# Patient Record
Sex: FEMALE | Race: WHITE | HISPANIC OR LATINO | URBAN - METROPOLITAN AREA
[De-identification: names, ages, dates, MRNs, and addresses within clinical notes are randomized per-mention and may not be internally consistent; named-entity substitution may affect disease eponyms.]

---

## 2024-07-11 ENCOUNTER — EMERGENCY (EMERGENCY)
Facility: HOSPITAL | Age: 69
LOS: 1 days | Discharge: HOME | End: 2024-07-11
Attending: EMERGENCY MEDICINE
Payer: MEDICARE

## 2024-07-11 VITALS
RESPIRATION RATE: 18 BRPM | HEART RATE: 83 BPM | TEMPERATURE: 98 F | OXYGEN SATURATION: 99 % | WEIGHT: 147.71 LBS | DIASTOLIC BLOOD PRESSURE: 85 MMHG | SYSTOLIC BLOOD PRESSURE: 181 MMHG

## 2024-07-11 VITALS
HEART RATE: 55 BPM | SYSTOLIC BLOOD PRESSURE: 107 MMHG | OXYGEN SATURATION: 99 % | DIASTOLIC BLOOD PRESSURE: 65 MMHG | RESPIRATION RATE: 58 BRPM

## 2024-07-11 PROCEDURE — 93005 ELECTROCARDIOGRAM TRACING: CPT

## 2024-07-11 PROCEDURE — 99284 EMERGENCY DEPT VISIT MOD MDM: CPT

## 2024-07-11 PROCEDURE — T1013: CPT

## 2024-07-11 PROCEDURE — 93010 ELECTROCARDIOGRAM REPORT: CPT

## 2024-07-11 PROCEDURE — 99283 EMERGENCY DEPT VISIT LOW MDM: CPT | Mod: 25

## 2024-10-23 ENCOUNTER — INPATIENT (INPATIENT)
Facility: HOSPITAL | Age: 69
LOS: 1 days | Discharge: HOME | DRG: 312 | End: 2024-10-25
Attending: HOSPITALIST | Admitting: STUDENT IN AN ORGANIZED HEALTH CARE EDUCATION/TRAINING PROGRAM
Payer: MEDICARE

## 2024-10-23 VITALS
TEMPERATURE: 97 F | RESPIRATION RATE: 16 BRPM | HEART RATE: 76 BPM | SYSTOLIC BLOOD PRESSURE: 130 MMHG | DIASTOLIC BLOOD PRESSURE: 76 MMHG | OXYGEN SATURATION: 99 %

## 2024-10-23 DIAGNOSIS — R56.9 UNSPECIFIED CONVULSIONS: ICD-10-CM

## 2024-10-23 LAB
ALBUMIN SERPL ELPH-MCNC: 4.1 G/DL — SIGNIFICANT CHANGE UP (ref 3.3–5.2)
ALP SERPL-CCNC: 93 U/L — SIGNIFICANT CHANGE UP (ref 40–120)
ALT FLD-CCNC: 25 U/L — SIGNIFICANT CHANGE UP
ANION GAP SERPL CALC-SCNC: 12 MMOL/L — SIGNIFICANT CHANGE UP (ref 5–17)
AST SERPL-CCNC: 32 U/L — HIGH
BASOPHILS # BLD AUTO: 0.02 K/UL — SIGNIFICANT CHANGE UP (ref 0–0.2)
BASOPHILS NFR BLD AUTO: 0.2 % — SIGNIFICANT CHANGE UP (ref 0–2)
BILIRUB SERPL-MCNC: 0.5 MG/DL — SIGNIFICANT CHANGE UP (ref 0.4–2)
BUN SERPL-MCNC: 20.6 MG/DL — HIGH (ref 8–20)
CALCIUM SERPL-MCNC: 10.2 MG/DL — SIGNIFICANT CHANGE UP (ref 8.4–10.5)
CHLORIDE SERPL-SCNC: 104 MMOL/L — SIGNIFICANT CHANGE UP (ref 96–108)
CO2 SERPL-SCNC: 26 MMOL/L — SIGNIFICANT CHANGE UP (ref 22–29)
CREAT SERPL-MCNC: 1.2 MG/DL — SIGNIFICANT CHANGE UP (ref 0.5–1.3)
EGFR: 48 ML/MIN/1.73M2 — LOW
EOSINOPHIL # BLD AUTO: 0.05 K/UL — SIGNIFICANT CHANGE UP (ref 0–0.5)
EOSINOPHIL NFR BLD AUTO: 0.5 % — SIGNIFICANT CHANGE UP (ref 0–6)
GLUCOSE SERPL-MCNC: 141 MG/DL — HIGH (ref 70–99)
HCT VFR BLD CALC: 38.3 % — SIGNIFICANT CHANGE UP (ref 34.5–45)
HGB BLD-MCNC: 12.9 G/DL — SIGNIFICANT CHANGE UP (ref 11.5–15.5)
IMM GRANULOCYTES NFR BLD AUTO: 0.2 % — SIGNIFICANT CHANGE UP (ref 0–0.9)
LYMPHOCYTES # BLD AUTO: 1.49 K/UL — SIGNIFICANT CHANGE UP (ref 1–3.3)
LYMPHOCYTES # BLD AUTO: 14.3 % — SIGNIFICANT CHANGE UP (ref 13–44)
MCHC RBC-ENTMCNC: 31.2 PG — SIGNIFICANT CHANGE UP (ref 27–34)
MCHC RBC-ENTMCNC: 33.7 GM/DL — SIGNIFICANT CHANGE UP (ref 32–36)
MCV RBC AUTO: 92.5 FL — SIGNIFICANT CHANGE UP (ref 80–100)
MONOCYTES # BLD AUTO: 0.61 K/UL — SIGNIFICANT CHANGE UP (ref 0–0.9)
MONOCYTES NFR BLD AUTO: 5.9 % — SIGNIFICANT CHANGE UP (ref 2–14)
NEUTROPHILS # BLD AUTO: 8.23 K/UL — HIGH (ref 1.8–7.4)
NEUTROPHILS NFR BLD AUTO: 78.9 % — HIGH (ref 43–77)
PLATELET # BLD AUTO: 264 K/UL — SIGNIFICANT CHANGE UP (ref 150–400)
POTASSIUM SERPL-MCNC: 5.2 MMOL/L — SIGNIFICANT CHANGE UP (ref 3.5–5.3)
POTASSIUM SERPL-SCNC: 5.2 MMOL/L — SIGNIFICANT CHANGE UP (ref 3.5–5.3)
PROT SERPL-MCNC: 7.2 G/DL — SIGNIFICANT CHANGE UP (ref 6.6–8.7)
RBC # BLD: 4.14 M/UL — SIGNIFICANT CHANGE UP (ref 3.8–5.2)
RBC # FLD: 13.1 % — SIGNIFICANT CHANGE UP (ref 10.3–14.5)
SODIUM SERPL-SCNC: 142 MMOL/L — SIGNIFICANT CHANGE UP (ref 135–145)
TROPONIN T, HIGH SENSITIVITY RESULT: 14 NG/L — SIGNIFICANT CHANGE UP (ref 0–51)
TROPONIN T, HIGH SENSITIVITY RESULT: 17 NG/L — SIGNIFICANT CHANGE UP (ref 0–51)
WBC # BLD: 10.42 K/UL — SIGNIFICANT CHANGE UP (ref 3.8–10.5)
WBC # FLD AUTO: 10.42 K/UL — SIGNIFICANT CHANGE UP (ref 3.8–10.5)

## 2024-10-23 PROCEDURE — 71045 X-RAY EXAM CHEST 1 VIEW: CPT | Mod: 26

## 2024-10-23 PROCEDURE — 99223 1ST HOSP IP/OBS HIGH 75: CPT

## 2024-10-23 PROCEDURE — 70450 CT HEAD/BRAIN W/O DYE: CPT | Mod: 26,MC

## 2024-10-23 PROCEDURE — 93010 ELECTROCARDIOGRAM REPORT: CPT

## 2024-10-23 PROCEDURE — 99285 EMERGENCY DEPT VISIT HI MDM: CPT

## 2024-10-23 RX ORDER — ACETAMINOPHEN 500 MG
650 TABLET ORAL EVERY 6 HOURS
Refills: 0 | Status: DISCONTINUED | OUTPATIENT
Start: 2024-10-23 | End: 2024-10-25

## 2024-10-23 RX ORDER — RALOXIFENE HYDROCHLORIDE 60 MG/1
1 TABLET, FILM COATED ORAL
Refills: 0 | DISCHARGE

## 2024-10-23 RX ORDER — CELECOXIB 100 MG
200 CAPSULE ORAL DAILY
Refills: 0 | Status: DISCONTINUED | OUTPATIENT
Start: 2024-10-23 | End: 2024-10-25

## 2024-10-23 RX ORDER — MELATONIN 5 MG
3 TABLET ORAL AT BEDTIME
Refills: 0 | Status: DISCONTINUED | OUTPATIENT
Start: 2024-10-23 | End: 2024-10-25

## 2024-10-23 RX ORDER — VALSARTAN 160 MG/1
320 TABLET ORAL DAILY
Refills: 0 | Status: DISCONTINUED | OUTPATIENT
Start: 2024-10-23 | End: 2024-10-25

## 2024-10-23 RX ORDER — ONDANSETRON HYDROCHLORIDE 2 MG/ML
4 INJECTION, SOLUTION INTRAMUSCULAR; INTRAVENOUS EVERY 8 HOURS
Refills: 0 | Status: DISCONTINUED | OUTPATIENT
Start: 2024-10-23 | End: 2024-10-25

## 2024-10-23 RX ORDER — VALSARTAN 160 MG/1
1 TABLET ORAL
Refills: 0 | DISCHARGE

## 2024-10-23 RX ORDER — RALOXIFENE HYDROCHLORIDE 60 MG/1
60 TABLET, FILM COATED ORAL DAILY
Refills: 0 | Status: DISCONTINUED | OUTPATIENT
Start: 2024-10-23 | End: 2024-10-25

## 2024-10-23 RX ORDER — SODIUM CHLORIDE 9 MG/ML
1000 INJECTION, SOLUTION INTRAMUSCULAR; INTRAVENOUS; SUBCUTANEOUS ONCE
Refills: 0 | Status: COMPLETED | OUTPATIENT
Start: 2024-10-23 | End: 2024-10-23

## 2024-10-23 RX ORDER — MAGNESIUM, ALUMINUM HYDROXIDE 200-200 MG
30 TABLET,CHEWABLE ORAL EVERY 4 HOURS
Refills: 0 | Status: DISCONTINUED | OUTPATIENT
Start: 2024-10-23 | End: 2024-10-25

## 2024-10-23 RX ADMIN — SODIUM CHLORIDE 1000 MILLILITER(S): 9 INJECTION, SOLUTION INTRAMUSCULAR; INTRAVENOUS; SUBCUTANEOUS at 16:58

## 2024-10-23 NOTE — H&P ADULT - ASSESSMENT
ASSESSMENT:  69F with PMHX Pre-DM2, HTN, HLD, Osteoporosis, Osteoarthritis presents to [facility] ER c/o weakness and syncope after gardening/doing yard work outside admitted for syncope and seizure workup.     PLAN:  Syncope r/o Seizure  -Admit to [place]  -VS q4  -EKG +NSR 78bpm no acute sttw changes qtc 435 read pending  -Repeat EKG in AM  -Troponin x2 negative  -Repeat Labs AM  -Check TTE  -CTH WO negative  -Check EEG  -Neuro consulted for AM  -Seizure/Fall Precautions    HTN, HLD  -Valsartan 320mg q24  -Atorvastatin 40mg qHS    Pre-DM2  -Lifestyle modification. Outpt F/u for continued mgmt.    Osteoporosis/Osteoarthritis  -Meloxicam 7.5mg q24  -Raloxifene 60mg q24    VTE PPX: SCDs. Encourage ambulation.   DISPO: Acute. Pending further workup/treatment for syncope and seizure. Pending neuro eval in AM. DC home in 24-48 hours.

## 2024-10-23 NOTE — H&P ADULT - NSHPPHYSICALEXAM_GEN_ALL_CORE
Vital Signs Last 24 Hrs  T(C): 36.4 (24 Oct 2024 00:56), Max: 36.6 (23 Oct 2024 20:31)  T(F): 97.6 (24 Oct 2024 00:56), Max: 97.9 (23 Oct 2024 20:31)  HR: 68 (24 Oct 2024 00:56) (68 - 79)  BP: 142/70 (24 Oct 2024 00:56) (130/76 - 142/70)  BP(mean): --  RR: 18 (24 Oct 2024 00:56) (13 - 18)  SpO2: 98% (24 Oct 2024 00:56) (98% - 100%)    Parameters below as of 24 Oct 2024 00:56  Patient On (Oxygen Delivery Method): room air    Constitutional: NAD, VSS  Head: NC/AT  Eyes: PERRL, EOMI, anicteric sclera, conjunctiva WNL  ENT: Normal Pharynx, MMM, No tonsillar exudate/erythema  Neck: Supple, Non-tender  Chest: Non-tender, no rashes  Cardio: RRR, s1/s2, no appreciable murmurs/rubs/gallops  Resp: BS CTA bilaterally, no wheezing/rhonchi/rales  Abd: Soft, Non-tender, Non-distended, no rebound/guarding/rigidity  : not examined  Rectal: not examined  MSK: moving all extremities, no motor weakness, full ROM x4  Ext: palpable distal pulses, good capillary refill, no clubbing/cyanosis/edema  Psych: appropriate, cooperative  Neuro: CN II-XII grossly intact, no focal deficits  Skin: Warm/Dry. No rashes.

## 2024-10-23 NOTE — ED ADULT TRIAGE NOTE - CHIEF COMPLAINT QUOTE
arrive to ED c/o weakness, near syncope after doing gardening, now feels better. did not eat prior to events. finger stick, ekg in progress. ed  nick

## 2024-10-23 NOTE — ED ADULT NURSE NOTE - OBJECTIVE STATEMENT
Pt A&Ox4, NAD. Pt presents to the ED s/p near syncopal episode PTA. Pt with no complaints at this time. Pt stating that she is feeling better. Breathing even and unlabored. Placed on CM, ED workup in progress.

## 2024-10-23 NOTE — ED PROVIDER NOTE - OBJECTIVE STATEMENT
Pt is a 70 yo F here for syncope/seizure.  Pt states that she was outside today when she felt very fatigued. Pt states that she went inside and her vision was black and her  sat her down. Pt then started to shake for approx. 30 seconds and had incontinence. Pt woke up and was mildly confused just stated hse didn't feel well or know what happened.  this has never happened before. no cp. nso ob. no n/v.

## 2024-10-23 NOTE — ED PROVIDER NOTE - CLINICAL SUMMARY MEDICAL DECISION MAKING FREE TEXT BOX
Pt with syncope v. seizure.  will check labs and Ct. Case d/w neuro attending, dr. fernandez, who agrees that this is likely convulsions related to syncope but given lack of clarity would offer admission for EEG. Pt and family prefer admission for EEg. Pt signed out to dr. howard pending labs, ct, and admission.

## 2024-10-23 NOTE — ED ADULT NURSE NOTE - NSFALLUNIVINTERV_ED_ALL_ED
Bed/Stretcher in lowest position, wheels locked, appropriate side rails in place/Call bell, personal items and telephone in reach/Instruct patient to call for assistance before getting out of bed/chair/stretcher/Non-slip footwear applied when patient is off stretcher/[place] to call system/Physically safe environment - no spills, clutter or unnecessary equipment/Purposeful proactive rounding/Room/bathroom lighting operational, light cord in reach

## 2024-10-23 NOTE — ED PROVIDER NOTE - NSTIMEPROVIDERCAREINITIATE_GEN_ER
Is This A New Presentation, Or A Follow-Up?: Skin Lesion
Additional History: Est pt would like Jarrett Baker M.D. to examine back of right leg, denies gown and full body skin exam. Last full body skin exam 08/22.
23-Oct-2024 15:48

## 2024-10-23 NOTE — H&P ADULT - HISTORY OF PRESENT ILLNESS
Pt seen/examined prior to midnight on 10/23/24.    69F with PMHX Pre-DM2, HTN, HLD, Osteoporosis, Osteoarthritis presents to [facility] ER c/o weakness and syncope after gardening/doing yard work outside. Episode witnessed by  at bedside who stated she felt dizzy then blacked out and had some shaking with associated urinary incontinence. Pt woke up mildly confused which self-resolved shortly thereafter without intervention. No prior similar episodes. She gardens/does yard work frequently and did not over-exert herself. No other complaints.    ROS negative unless mentioned.    PMHX: Pre-DM2, HTN, HLD, Osteoporosis, Osteoarthritis   PSHX: Denies  FamHx: Denies fam hx HTN  Social Hx: Denies etoh/tobacco/drug abuse

## 2024-10-23 NOTE — ED ADULT NURSE REASSESSMENT NOTE - NS ED NURSE REASSESS COMMENT FT1
Report received from GIO Garcia. PT is alert and oriented, with a patent and self maintained airway, with non labored breathing. PT denies CP, SOB, HA, N/V/D, Fevers or chills.
No

## 2024-10-23 NOTE — H&P ADULT - TIME BILLING
Labs/Imaging/Notes reviewed. Med rec confirmed. Orders placed. Neuro consulted for AM for possible seizure reccs appreciated.

## 2024-10-24 ENCOUNTER — RESULT REVIEW (OUTPATIENT)
Age: 69
End: 2024-10-24

## 2024-10-24 DIAGNOSIS — I47.19 OTHER SUPRAVENTRICULAR TACHYCARDIA: ICD-10-CM

## 2024-10-24 DIAGNOSIS — R55 SYNCOPE AND COLLAPSE: ICD-10-CM

## 2024-10-24 LAB
A1C WITH ESTIMATED AVERAGE GLUCOSE RESULT: 6.5 % — HIGH (ref 4–5.6)
ALBUMIN SERPL ELPH-MCNC: 3.5 G/DL — SIGNIFICANT CHANGE UP (ref 3.3–5.2)
ALP SERPL-CCNC: 80 U/L — SIGNIFICANT CHANGE UP (ref 40–120)
ALT FLD-CCNC: 20 U/L — SIGNIFICANT CHANGE UP
ANION GAP SERPL CALC-SCNC: 12 MMOL/L — SIGNIFICANT CHANGE UP (ref 5–17)
AST SERPL-CCNC: 26 U/L — SIGNIFICANT CHANGE UP
BASOPHILS # BLD AUTO: 0.03 K/UL — SIGNIFICANT CHANGE UP (ref 0–0.2)
BASOPHILS NFR BLD AUTO: 0.5 % — SIGNIFICANT CHANGE UP (ref 0–2)
BILIRUB SERPL-MCNC: 0.4 MG/DL — SIGNIFICANT CHANGE UP (ref 0.4–2)
BUN SERPL-MCNC: 17.4 MG/DL — SIGNIFICANT CHANGE UP (ref 8–20)
CALCIUM SERPL-MCNC: 8.8 MG/DL — SIGNIFICANT CHANGE UP (ref 8.4–10.5)
CHLORIDE SERPL-SCNC: 107 MMOL/L — SIGNIFICANT CHANGE UP (ref 96–108)
CHOLEST SERPL-MCNC: 143 MG/DL — SIGNIFICANT CHANGE UP
CK SERPL-CCNC: 115 U/L — SIGNIFICANT CHANGE UP (ref 25–170)
CO2 SERPL-SCNC: 22 MMOL/L — SIGNIFICANT CHANGE UP (ref 22–29)
CREAT SERPL-MCNC: 0.77 MG/DL — SIGNIFICANT CHANGE UP (ref 0.5–1.3)
EGFR: 82 ML/MIN/1.73M2 — SIGNIFICANT CHANGE UP
EOSINOPHIL # BLD AUTO: 0.12 K/UL — SIGNIFICANT CHANGE UP (ref 0–0.5)
EOSINOPHIL NFR BLD AUTO: 1.9 % — SIGNIFICANT CHANGE UP (ref 0–6)
ESTIMATED AVERAGE GLUCOSE: 140 MG/DL — HIGH (ref 68–114)
GLUCOSE SERPL-MCNC: 110 MG/DL — HIGH (ref 70–99)
HCT VFR BLD CALC: 35 % — SIGNIFICANT CHANGE UP (ref 34.5–45)
HDLC SERPL-MCNC: 54 MG/DL — SIGNIFICANT CHANGE UP
HGB BLD-MCNC: 11.6 G/DL — SIGNIFICANT CHANGE UP (ref 11.5–15.5)
IMM GRANULOCYTES NFR BLD AUTO: 0.3 % — SIGNIFICANT CHANGE UP (ref 0–0.9)
LIPID PNL WITH DIRECT LDL SERPL: 76 MG/DL — SIGNIFICANT CHANGE UP
LYMPHOCYTES # BLD AUTO: 2.49 K/UL — SIGNIFICANT CHANGE UP (ref 1–3.3)
LYMPHOCYTES # BLD AUTO: 39.3 % — SIGNIFICANT CHANGE UP (ref 13–44)
MAGNESIUM SERPL-MCNC: 1.6 MG/DL — SIGNIFICANT CHANGE UP (ref 1.6–2.6)
MCHC RBC-ENTMCNC: 30.9 PG — SIGNIFICANT CHANGE UP (ref 27–34)
MCHC RBC-ENTMCNC: 33.1 GM/DL — SIGNIFICANT CHANGE UP (ref 32–36)
MCV RBC AUTO: 93.3 FL — SIGNIFICANT CHANGE UP (ref 80–100)
MONOCYTES # BLD AUTO: 0.55 K/UL — SIGNIFICANT CHANGE UP (ref 0–0.9)
MONOCYTES NFR BLD AUTO: 8.7 % — SIGNIFICANT CHANGE UP (ref 2–14)
NEUTROPHILS # BLD AUTO: 3.12 K/UL — SIGNIFICANT CHANGE UP (ref 1.8–7.4)
NEUTROPHILS NFR BLD AUTO: 49.3 % — SIGNIFICANT CHANGE UP (ref 43–77)
NON HDL CHOLESTEROL: 89 MG/DL — SIGNIFICANT CHANGE UP
PHOSPHATE SERPL-MCNC: 2.9 MG/DL — SIGNIFICANT CHANGE UP (ref 2.4–4.7)
PLATELET # BLD AUTO: 218 K/UL — SIGNIFICANT CHANGE UP (ref 150–400)
POTASSIUM SERPL-MCNC: 4.5 MMOL/L — SIGNIFICANT CHANGE UP (ref 3.5–5.3)
POTASSIUM SERPL-SCNC: 4.5 MMOL/L — SIGNIFICANT CHANGE UP (ref 3.5–5.3)
PROT SERPL-MCNC: 5.9 G/DL — LOW (ref 6.6–8.7)
RBC # BLD: 3.75 M/UL — LOW (ref 3.8–5.2)
RBC # FLD: 13.2 % — SIGNIFICANT CHANGE UP (ref 10.3–14.5)
SODIUM SERPL-SCNC: 141 MMOL/L — SIGNIFICANT CHANGE UP (ref 135–145)
TRIGL SERPL-MCNC: 64 MG/DL — SIGNIFICANT CHANGE UP
TROPONIN T, HIGH SENSITIVITY RESULT: 11 NG/L — SIGNIFICANT CHANGE UP (ref 0–51)
TROPONIN T, HIGH SENSITIVITY RESULT: 16 NG/L — SIGNIFICANT CHANGE UP (ref 0–51)
TSH SERPL-MCNC: 2.96 UIU/ML — SIGNIFICANT CHANGE UP (ref 0.27–4.2)
VIT B12 SERPL-MCNC: 1441 PG/ML — HIGH (ref 232–1245)
WBC # BLD: 6.33 K/UL — SIGNIFICANT CHANGE UP (ref 3.8–10.5)
WBC # FLD AUTO: 6.33 K/UL — SIGNIFICANT CHANGE UP (ref 3.8–10.5)

## 2024-10-24 PROCEDURE — 95720 EEG PHY/QHP EA INCR W/VEEG: CPT

## 2024-10-24 PROCEDURE — 93010 ELECTROCARDIOGRAM REPORT: CPT

## 2024-10-24 PROCEDURE — 99222 1ST HOSP IP/OBS MODERATE 55: CPT

## 2024-10-24 PROCEDURE — 93306 TTE W/DOPPLER COMPLETE: CPT | Mod: 26

## 2024-10-24 PROCEDURE — 99233 SBSQ HOSP IP/OBS HIGH 50: CPT

## 2024-10-24 PROCEDURE — 99223 1ST HOSP IP/OBS HIGH 75: CPT

## 2024-10-24 RX ORDER — MAGNESIUM SULFATE IN 0.9% NACL 2 G/50 ML
2 INTRAVENOUS SOLUTION, PIGGYBACK (ML) INTRAVENOUS ONCE
Refills: 0 | Status: COMPLETED | OUTPATIENT
Start: 2024-10-24 | End: 2024-10-24

## 2024-10-24 RX ORDER — INFLUENZ VIR VAC TV P-SURF2003 15MCG/.5ML
0.5 SYRINGE (ML) INTRAMUSCULAR ONCE
Refills: 0 | Status: DISCONTINUED | OUTPATIENT
Start: 2024-10-24 | End: 2024-10-25

## 2024-10-24 RX ORDER — MAGNESIUM OXIDE 400 MG/1
400 TABLET ORAL DAILY
Refills: 0 | Status: DISCONTINUED | OUTPATIENT
Start: 2024-10-24 | End: 2024-10-25

## 2024-10-24 RX ORDER — METOPROLOL TARTRATE 50 MG
25 TABLET ORAL
Refills: 0 | Status: DISCONTINUED | OUTPATIENT
Start: 2024-10-24 | End: 2024-10-25

## 2024-10-24 RX ADMIN — RALOXIFENE HYDROCHLORIDE 60 MILLIGRAM(S): 60 TABLET, FILM COATED ORAL at 12:30

## 2024-10-24 RX ADMIN — Medication 200 MILLIGRAM(S): at 12:30

## 2024-10-24 RX ADMIN — Medication 25 MILLIGRAM(S): at 18:05

## 2024-10-24 RX ADMIN — Medication 25 GRAM(S): at 10:32

## 2024-10-24 RX ADMIN — Medication 40 MILLIGRAM(S): at 22:24

## 2024-10-24 RX ADMIN — VALSARTAN 320 MILLIGRAM(S): 160 TABLET ORAL at 06:36

## 2024-10-24 RX ADMIN — MAGNESIUM OXIDE 400 MILLIGRAM(S): 400 TABLET ORAL at 12:30

## 2024-10-24 RX ADMIN — Medication 30 MILLILITER(S): at 22:24

## 2024-10-24 NOTE — PROGRESS NOTE ADULT - SUBJECTIVE AND OBJECTIVE BOX
Patient is a 69y old  Female who presents with a chief complaint of Syncope/Seizure (23 Oct 2024 23:01)    Chart reviewed     incomplete .     ALLERGIES:  No Known Allergies    MEDICATIONS  (STANDING):  atorvastatin 40 milliGRAM(s) Oral at bedtime  celecoxib 200 milliGRAM(s) Oral daily  influenza  Vaccine (HIGH DOSE) 0.5 milliLiter(s) IntraMuscular once  magnesium oxide 400 milliGRAM(s) Oral daily  magnesium sulfate  IVPB 2 Gram(s) IV Intermittent once  raloxifene 60 milliGRAM(s) Oral daily  valsartan 320 milliGRAM(s) Oral daily    MEDICATIONS  (PRN):  acetaminophen     Tablet .. 650 milliGRAM(s) Oral every 6 hours PRN Temp greater or equal to 38C (100.4F), Mild Pain (1 - 3)  aluminum hydroxide/magnesium hydroxide/simethicone Suspension 30 milliLiter(s) Oral every 4 hours PRN Dyspepsia  melatonin 3 milliGRAM(s) Oral at bedtime PRN Insomnia  ondansetron Injectable 4 milliGRAM(s) IV Push every 8 hours PRN Nausea and/or Vomiting    Vital Signs Last 24 Hrs  T(F): 97.8 (24 Oct 2024 06:45), Max: 98.1 (24 Oct 2024 03:20)  HR: 74 (24 Oct 2024 06:45) (68 - 79)  BP: 143/78 (24 Oct 2024 06:45) (130/76 - 160/80)  RR: 18 (24 Oct 2024 06:45) (13 - 18)  SpO2: 97% (24 Oct 2024 06:45) (97% - 100%)  I&O's Summary      PHYSICAL EXAM:  General:   ENT:   Neck:   Lungs:   Cardio: RRR, S1/S2, No murmur  Abdomen: Soft, Nontender, Nondistended; Bowel sounds present  Extremities: No calf tenderness, No pitting edema    LABS:                        11.6   6.33  )-----------( 218      ( 24 Oct 2024 06:28 )             35.0     10-24    141  |  107  |  17.4  ----------------------------<  110  4.5   |  22.0  |  0.77    Ca    8.8      24 Oct 2024 06:28  Phos  2.9     10-24  Mg     1.6     10-24    TPro  5.9  /  Alb  3.5  /  TBili  0.4  /  DBili  x   /  AST  26  /  ALT  20  /  AlkPhos  80  10-24                                  POCT Blood Glucose.: 117 mg/dL (23 Oct 2024 14:10)      Urinalysis Basic - ( 24 Oct 2024 06:28 )    Color: x / Appearance: x / SG: x / pH: x  Gluc: 110 mg/dL / Ketone: x  / Bili: x / Urobili: x   Blood: x / Protein: x / Nitrite: x   Leuk Esterase: x / RBC: x / WBC x   Sq Epi: x / Non Sq Epi: x / Bacteria: x          RADIOLOGY & ADDITIONAL TESTS:       Patient is a 69y old  Female who presents with a chief complaint of Syncope/Seizure (23 Oct 2024 23:01)    Chart reviewed   pt is seen in am, she is  feeling well   no dizziness         ALLERGIES:  No Known Allergies    MEDICATIONS  (STANDING):  atorvastatin 40 milliGRAM(s) Oral at bedtime  celecoxib 200 milliGRAM(s) Oral daily  influenza  Vaccine (HIGH DOSE) 0.5 milliLiter(s) IntraMuscular once  magnesium oxide 400 milliGRAM(s) Oral daily  magnesium sulfate  IVPB 2 Gram(s) IV Intermittent once  raloxifene 60 milliGRAM(s) Oral daily  valsartan 320 milliGRAM(s) Oral daily    MEDICATIONS  (PRN):  acetaminophen     Tablet .. 650 milliGRAM(s) Oral every 6 hours PRN Temp greater or equal to 38C (100.4F), Mild Pain (1 - 3)  aluminum hydroxide/magnesium hydroxide/simethicone Suspension 30 milliLiter(s) Oral every 4 hours PRN Dyspepsia  melatonin 3 milliGRAM(s) Oral at bedtime PRN Insomnia  ondansetron Injectable 4 milliGRAM(s) IV Push every 8 hours PRN Nausea and/or Vomiting    Vital Signs Last 24 Hrs  T(F): 97.8 (24 Oct 2024 06:45), Max: 98.1 (24 Oct 2024 03:20)  HR: 74 (24 Oct 2024 06:45) (68 - 79)  BP: 143/78 (24 Oct 2024 06:45) (130/76 - 160/80)  RR: 18 (24 Oct 2024 06:45) (13 - 18)  SpO2: 97% (24 Oct 2024 06:45) (97% - 100%)  I&O's Summary      PHYSICAL EXAM:  General: awake alert   Neck: supple , no jvd   Lungs: CTA bilateral   Cardio: RRR, S1/S2, No murmur  Abdomen: Soft, Nontender, Nondistended; Bowel sounds present  Extremities: No calf tenderness, No pitting edema  Skin normal color     LABS:                        11.6   6.33  )-----------( 218      ( 24 Oct 2024 06:28 )             35.0     10-24    141  |  107  |  17.4  ----------------------------<  110  4.5   |  22.0  |  0.77    Ca    8.8      24 Oct 2024 06:28  Phos  2.9     10-24  Mg     1.6     10-24    TPro  5.9  /  Alb  3.5  /  TBili  0.4  /  DBili  x   /  AST  26  /  ALT  20  /  AlkPhos  80  10-24                                  POCT Blood Glucose.: 117 mg/dL (23 Oct 2024 14:10)      Urinalysis Basic - ( 24 Oct 2024 06:28 )    Color: x / Appearance: x / SG: x / pH: x  Gluc: 110 mg/dL / Ketone: x  / Bili: x / Urobili: x   Blood: x / Protein: x / Nitrite: x   Leuk Esterase: x / RBC: x / WBC x   Sq Epi: x / Non Sq Epi: x / Bacteria: x          RADIOLOGY & ADDITIONAL TESTS:

## 2024-10-24 NOTE — PATIENT PROFILE ADULT - FALL HARM RISK - HARM RISK INTERVENTIONS
Assistance with ambulation/Communicate Risk of Fall with Harm to all staff/Monitor gait and stability/Reinforce activity limits and safety measures with patient and family/Sit up slowly, dangle for a short time, stand at bedside before walking/Tailored Fall Risk Interventions/Use of alarms - bed, chair and/or voice tab/Visual Cue: Yellow wristband and red socks/Bed in lowest position, wheels locked, appropriate side rails in place/Call bell, personal items and telephone in reach/Instruct patient to call for assistance before getting out of bed or chair/Non-slip footwear when patient is out of bed/[place] to call system/Physically safe environment - no spills, clutter or unnecessary equipment/Purposeful Proactive Rounding/Room/bathroom lighting operational, light cord in reach

## 2024-10-24 NOTE — CHART NOTE - NSCHARTNOTEFT_GEN_A_CORE
EEG preliminary read (not final) on the initial recording hour(s) ~2 hrs    No seizures were recorded during this portion of the study. PDR 10 Hz, normal awake state.  Final report to follow tomorrow morning after completion of study.    This is a preliminary impression still pending attendings attestation.     -------------------------------------------------------------------------------------------------------  EEG reading room: [phone]    After-hours epilepsy service: [phone]      Pascual Sharma DO  Epilepsy Fellow

## 2024-10-24 NOTE — CONSULT NOTE ADULT - ASSESSMENT
The patient is a 69y Female who is followed by neurology because of possible seizure.  The description provided by her  sounds like it could be syncope vs seziure    Syncope vs seizure  ? due to dehydration  ? seizure    - check continuous EEG 24-48 hours    - hold off on starting anti-seizure meds now    - will need MRI epilepsy protocol with contrast if EEG abnormal    will follow with you    Burke Pascual MD PhD   [id number]
69F with PMHX Pre-DM2, HTN, HLD, Osteoporosis, Osteoarthritis presents to [facility] ER c/o after a syncopal event witnessed by .  She was gardening and she got up went to  said she was dizzy  and she sat down and then "went out " in chair.  Denies any chest pain or sob.  We are being called because on montior she had a asymtomatic 7 beat run of nsvt.  Also had a run of svt.  She denies any chest pain or sob  Cm revealed 7 beat wide complex tachycardia and run of svt

## 2024-10-24 NOTE — CONSULT NOTE ADULT - PROBLEM SELECTOR RECOMMENDATION 9
In setting of wide complex tachycardia ? aberrancy   Mg was 1.6 and k >4  Mg supplemented  will scheduled Echo and further workup dependent upon echo results  Check orthostatics  Carotid dopplers pending

## 2024-10-24 NOTE — CONSULT NOTE ADULT - SUBJECTIVE AND OBJECTIVE BOX
[facility]                                              CARDIOLOGY AT [place] [address]                                             Telephone: [phone]. Fax:[phone]                                                         CARDIOLOGY CONSULTATION NOTE                                                                                             Consult requested by:  Dr Thibodeaux  History obtained by: Patient and medical record  Community Cardiologist: None   obtained: Yes [  ] No [ x ]  Reason for Consultation:   syncope   NSVT  Avialable out pt records reviewed: Yes [  ] No [  ]    Chief complaint:    Patient is a 69y old  Female who presents with a chief complaint of Syncope/Seizure (24 Oct 2024 07:58)        HPI:  Pt seen/examined prior to midnight on 10/23/24.    69F with PMHX Pre-DM2, HTN, HLD, Osteoporosis, Osteoarthritis presents to [facility] ER c/o weakness and syncope after gardening/doing yard work outside. Episode witnessed by  at bedside who stated she felt dizzy then blacked out and had some shaking with associated urinary incontinence. Pt woke up mildly confused which self-resolved shortly thereafter without intervention. No prior similar episodes. She gardens/does yard work frequently and did not over-exert herself. No other complaints.    ROS negative unless mentioned.    PMHX: Pre-DM2, HTN, HLD, Osteoporosis, Osteoarthritis   PSHX: Denies  FamHx: Denies fam hx HTN  Social Hx: Denies etoh/tobacco/drug abuse (23 Oct 2024 23:01)        CARDIAC TESTING   ECHO:      STRESS:    CATH:     ELECTROPHYSIOLOGY:       PAST MEDICAL HISTORY  HTN  HLD      PAST SURGICAL HISTORY  Denies    SOCIAL HISTORY:  Denies smoking/alcohol/drugs  Family History of Cardiovascular Disease:  Yes [  ] No [ x ]  Coronary Artery Disease in first degree relative: Yes [  ] No [ x ]  Sudden Cardiac Death in First degree relative: Yes [  ] No [ x ]      HOME MEDICATIONS:  Valsartan  simvastatin    CURRENT MEDICATIONS:  metoprolol tartrate 25 milliGRAM(s) Oral two times a day  valsartan 320 milliGRAM(s) Oral daily  celecoxib  atorvastatin  influenza  Vaccine (HIGH DOSE)  magnesium oxide  raloxifene        ALLERGIES: NKDA          REVIEW OF SYMPTOMS:   CONSTITUTIONAL: No fever, no chills, no weight loss, no weight gain, no fatigue   ENMT:  No vertigo; No sinus or throat pain  NECK: No pain or stiffness  CARDIOVASCULAR: No chest pain, no dyspnea, no syncope/presyncope, no palpitations, no dizziness, no Orthopnea, no Paroxsymal nocturnal dyspnea  RESPIRATORY: no Shortness of breath, no cough, no wheezing  : No dysuria, no hematuria   GI: No dark color stool, no nausea, no diarrhea, no constipation, no abdominal pain   NEURO: No headache, no slurred speech   MUSCULOSKELETAL: No joint pain or swelling; No muscle, back, or extremity pain  PSYCH: No agitation, no anxiety.    ALL OTHER REVIEW OF SYSTEMS ARE NEGATIVE.      Vital Signs Last 24 Hrs  T(C): 36.4 (24 Oct 2024 08:22), Max: 36.7 (24 Oct 2024 03:20)  T(F): 97.6 (24 Oct 2024 08:22), Max: 98.1 (24 Oct 2024 03:20)  HR: 84 (24 Oct 2024 08:22) (68 - 84)  BP: 163/85 (24 Oct 2024 08:22) (130/76 - 163/85)  BP(mean): 100 (24 Oct 2024 06:45) (100 - 100)  RR: 18 (24 Oct 2024 08:22) (13 - 18)  SpO2: 98% (24 Oct 2024 08:22) (97% - 100%)    Parameters below as of 24 Oct 2024 08:22  Patient On (Oxygen Delivery Method): room air      INTAKE AND OUTPUT:     PHYSICAL EXAM:  Constitutional: Comfortable . No acute distress.   HEENT: Atraumatic and normocephalic , neck is supple . no JVD. No carotid bruit.  CNS: A&Ox3. No focal deficits.   Respiratory: CTAB, unlabored   Cardiovascular: RRR normal s1 s2. No murmur. No rubs or gallop.  Gastrointestinal: Soft, non-tender. +Bowel sounds.   MSK: full ROM extremities x 4  Extremities: No edema. No cyanosis   Psychiatric: Calm . no agitation.   Skin: Warm and dry, no ulcers on extremities       LABS:                        11.6   6.33  )-----------( 218      ( 24 Oct 2024 06:28 )             35.0     10-24    141  |  107  |  17.4  ----------------------------<  110[H]  4.5   |  22.0  |  0.77    Ca    8.8      24 Oct 2024 06:28  Phos  2.9     10-24  Mg     1.6     10-24    TPro  5.9[L]  /  Alb  3.5  /  TBili  0.4  /  DBili  x   /  AST  26  /  ALT  20  /  AlkPhos  80  10-24      ;p-BNP=    Urinalysis Basic - ( 24 Oct 2024 06:28 )    Color: x / Appearance: x / SG: x / pH: x  Gluc: 110 mg/dL / Ketone: x  / Bili: x / Urobili: x   Blood: x / Protein: x / Nitrite: x   Leuk Esterase: x / RBC: x / WBC x   Sq Epi: x / Non Sq Epi: x / Bacteria: x          INTERPRETATION OF TELEMETRY:     ECG:   Prior ECG: Yes [  ] No [  ]      RADIOLOGY & ADDITIONAL STUDIES:    X-ray:  reviewed by me.   CT scan:   MRI:   US:                                                [facility]                                              CARDIOLOGY AT [address], [place] [address]                                             Telephone: [phone]. Fax:[phone]                                                         CARDIOLOGY CONSULTATION NOTE                                                                                             Consult requested by:  Dr Thibodeaux  History obtained by: Patient and medical record  Community Cardiologist: None   obtained: Yes [  ] No [ x ]  Reason for Consultation:   syncope   NSVT  Avialable out pt records reviewed: Yes [  ] No [  ]    This is a 69F with PMHX Pre-DM2, HTN, HLD, Osteoporosis, Osteoarthritis presents to [facility] ER c/o after a syncopal event witnessed by .  She was gardening and she got up went to  said she was dizzy  and she sat down and then "went out " in chair.  Denies any chest pain or sob.  We are being called because on monitor she had a asymptomatic 7 beat run of wide complex tachycardia    Also had a run of svt.  She denies any chest pain or sob      CARDIAC TESTING   None    PAST MEDICAL HISTORY  HTN  HLD  Pre diabetes    PAST SURGICAL HISTORY  Denies    SOCIAL HISTORY:  Denies smoking/alcohol/drugs  Family History of Cardiovascular Disease:  Yes [  ] No [ x ]  Coronary Artery Disease in first degree relative: Yes [  ] No [ x ]  Sudden Cardiac Death in First degree relative: Yes [  ] No [ x ]      HOME MEDICATIONS:  Valsartan  simvastatin    CURRENT MEDICATIONS:  metoprolol tartrate 25 milliGRAM(s) Oral two times a day  valsartan 320 milliGRAM(s) Oral daily  celecoxib  atorvastatin  influenza  Vaccine (HIGH DOSE)  magnesium oxide  raloxifene    ALLERGIES: NKDA    REVIEW OF SYMPTOMS:   CONSTITUTIONAL: No fever, no chills, no weight loss, no weight gain, no fatigue   ENMT:  No vertigo; No sinus or throat pain  NECK: No pain or stiffness  CARDIOVASCULAR: No chest pain, no dyspnea, + presyncope, no palpitations, no dizziness, no Orthopnea, no Paroxsymal nocturnal dyspnea  RESPIRATORY: no Shortness of breath, no cough, no wheezing  : No dysuria, no hematuria   GI: No dark color stool, no nausea, no diarrhea, no constipation, no abdominal pain   NEURO: No headache, no slurred speech   MUSCULOSKELETAL: No joint pain or swelling; No muscle, back, or extremity pain  PSYCH: No agitation, no anxiety.    ALL OTHER REVIEW OF SYSTEMS ARE NEGATIVE.      Vital Signs Last 24 Hrs  T(C): 36.4 (24 Oct 2024 08:22), Max: 36.7 (24 Oct 2024 03:20)  T(F): 97.6 (24 Oct 2024 08:22), Max: 98.1 (24 Oct 2024 03:20)  HR: 84 (24 Oct 2024 08:22) (68 - 84)  BP: 163/85 (24 Oct 2024 08:22) (130/76 - 163/85)  BP(mean): 100 (24 Oct 2024 06:45) (100 - 100)  RR: 18 (24 Oct 2024 08:22) (13 - 18)  SpO2: 98% (24 Oct 2024 08:22) (97% - 100%)    Parameters below as of 24 Oct 2024 08:22  Patient On (Oxygen Delivery Method): room air    INTAKE AND OUTPUT:     PHYSICAL EXAM:  Constitutional: Comfortable . No acute distress.   HEENT: Atraumatic and normocephalic , neck is supple . no JVD. No carotid bruit.  CNS: A&Ox3. No focal deficits.   Respiratory: CTAB, unlabored   Cardiovascular: RRR normal s1 s2. No murmur. No rubs or gallop.  Gastrointestinal: Soft, non-tender. +Bowel sounds.   MSK: full ROM extremities x 4  Extremities: No edema. No cyanosis   Psychiatric: Calm . no agitation.   Skin: Warm and dry, no ulcers on extremities     LABS:                        11.6   6.33  )-----------( 218      ( 24 Oct 2024 06:28 )             35.0     10-24    141  |  107  |  17.4  ----------------------------<  110[H]  4.5   |  22.0  |  0.77    Ca    8.8      24 Oct 2024 06:28  Phos  2.9     10-24  Mg     1.6     10-24    TPro  5.9[L]  /  Alb  3.5  /  TBili  0.4  /  DBili  x   /  AST  26  /  ALT  20  /  AlkPhos  80  10-24    Urinalysis Basic - ( 24 Oct 2024 06:28 )    Color: x / Appearance: x / SG: x / pH: x  Gluc: 110 mg/dL / Ketone: x  / Bili: x / Urobili: x   Blood: x / Protein: x / Nitrite: x   Leuk Esterase: x / RBC: x / WBC x   Sq Epi: x / Non Sq Epi: x / Bacteria: x    INTERPRETATION OF TELEMETRY:     ECG: NSR wnl  Prior ECG: Yes [  ] No [  ]      RADIOLOGY & ADDITIONAL STUDIES:    X-ray:  reviewed by me  napd  CT scan:   MRI:   US:                                                [facility]                                              CARDIOLOGY AT [address], [place] [address]                                             Telephone: [phone]. Fax:[phone]                                                         CARDIOLOGY CONSULTATION NOTE                                                                                             Consult requested by:  Dr Thibodeaux  History obtained by: Patient and medical record  Community Cardiologist: None   obtained: Yes [  ] No [ x ]  Reason for Consultation:   syncope   NSVT  Available out pt records reviewed: Yes [  ] No [  ]    This is a 69F with PMHX Pre-DM2, HTN, HLD, Osteoporosis, Osteoarthritis presents to [facility] ER c/o after a syncopal event witnessed by .  She was gardening and she got up went to  said she was dizzy  and she sat down and then "went out " in chair.  Denies any chest pain or sob.  We are being called because on monitor she had a asymptomatic 7 beat run of wide complex tachycardia    Also had a run of svt.  She denies any chest pain or sob      CARDIAC TESTING   None    PAST MEDICAL HISTORY  HTN  HLD  Pre diabetes    PAST SURGICAL HISTORY  Denies    SOCIAL HISTORY:  Denies smoking/alcohol/drugs  Family History of Cardiovascular Disease:  Yes [  ] No [ x ]  Coronary Artery Disease in first degree relative: Yes [  ] No [ x ]  Sudden Cardiac Death in First degree relative: Yes [  ] No [ x ]      HOME MEDICATIONS:  Valsartan  simvastatin    CURRENT MEDICATIONS:  metoprolol tartrate 25 milliGRAM(s) Oral two times a day  valsartan 320 milliGRAM(s) Oral daily  celecoxib  atorvastatin  influenza  Vaccine (HIGH DOSE)  magnesium oxide  raloxifene    ALLERGIES: NKDA    REVIEW OF SYMPTOMS:   CONSTITUTIONAL: No fever, no chills, no weight loss, no weight gain, no fatigue   ENMT:  No vertigo; No sinus or throat pain  NECK: No pain or stiffness  CARDIOVASCULAR: No chest pain, no dyspnea, + presyncope, no palpitations, no dizziness, no Orthopnea, no Paroxsymal nocturnal dyspnea  RESPIRATORY: no Shortness of breath, no cough, no wheezing  : No dysuria, no hematuria   GI: No dark color stool, no nausea, no diarrhea, no constipation, no abdominal pain   NEURO: No headache, no slurred speech   MUSCULOSKELETAL: No joint pain or swelling; No muscle, back, or extremity pain  PSYCH: No agitation, no anxiety.    ALL OTHER REVIEW OF SYSTEMS ARE NEGATIVE.      Vital Signs Last 24 Hrs  T(C): 36.4 (24 Oct 2024 08:22), Max: 36.7 (24 Oct 2024 03:20)  T(F): 97.6 (24 Oct 2024 08:22), Max: 98.1 (24 Oct 2024 03:20)  HR: 84 (24 Oct 2024 08:22) (68 - 84)  BP: 163/85 (24 Oct 2024 08:22) (130/76 - 163/85)  BP(mean): 100 (24 Oct 2024 06:45) (100 - 100)  RR: 18 (24 Oct 2024 08:22) (13 - 18)  SpO2: 98% (24 Oct 2024 08:22) (97% - 100%)    Parameters below as of 24 Oct 2024 08:22  Patient On (Oxygen Delivery Method): room air    INTAKE AND OUTPUT:     PHYSICAL EXAM:  Constitutional: Comfortable . No acute distress.   HEENT: Atraumatic and normocephalic , neck is supple . no JVD. No carotid bruit.  CNS: A&Ox3. No focal deficits.   Respiratory: CTAB, unlabored   Cardiovascular: RRR normal s1 s2. No murmur. No rubs or gallop.  Gastrointestinal: Soft, non-tender. +Bowel sounds.   MSK: full ROM extremities x 4  Extremities: No edema. No cyanosis   Psychiatric: Calm . no agitation.   Skin: Warm and dry, no ulcers on extremities     LABS:                        11.6   6.33  )-----------( 218      ( 24 Oct 2024 06:28 )             35.0     10-24    141  |  107  |  17.4  ----------------------------<  110[H]  4.5   |  22.0  |  0.77    Ca    8.8      24 Oct 2024 06:28  Phos  2.9     10-24  Mg     1.6     10-24    TPro  5.9[L]  /  Alb  3.5  /  TBili  0.4  /  DBili  x   /  AST  26  /  ALT  20  /  AlkPhos  80  10-24    Urinalysis Basic - ( 24 Oct 2024 06:28 )    Color: x / Appearance: x / SG: x / pH: x  Gluc: 110 mg/dL / Ketone: x  / Bili: x / Urobili: x   Blood: x / Protein: x / Nitrite: x   Leuk Esterase: x / RBC: x / WBC x   Sq Epi: x / Non Sq Epi: x / Bacteria: x    INTERPRETATION OF TELEMETRY:     ECG: NSR wnl  Prior ECG: Yes [  ] No [  ]      RADIOLOGY & ADDITIONAL STUDIES:    X-ray:  reviewed by me  napd  CT scan:   MRI:   US:

## 2024-10-24 NOTE — CONSULT NOTE ADULT - NS ATTEND AMEND GEN_ALL_CORE FT
examined at bed side, had syncopal episode, undergoing neuro workup at moment.   Tele with 2 brief episodes of fast PAT with aberrancy vs. NSVT.   TTE pending, on BB already.  CCTA vs Cath depending on TTE findings.

## 2024-10-24 NOTE — CONSULT NOTE ADULT - SUBJECTIVE AND OBJECTIVE BOX
[facility]                                     Neurology at [place]                                 Ankur Ayers, & Shubham                                  [address]                                        [place], [place], [address]                                             [phone]    CC: possible seizure  HPI:  The patient is a 69y Female who presented with 30 second episode of unresponsiveness.  He  witnessed the episode. Shehas been outside working in her back yard for 4 hours with little fluid intake and felt dizzy, her  sat her down.  She then had her vision fade to black, shook, turned he head to the right and had 30 seconds of LOC.  She had urine incontinence. There was no significant post ictal confusion.   No previous personal or family history of seizures were reported.  Neurology has been asked to evaluate.    PAST MEDICAL & SURGICAL HISTORY:  HTN (hypertension)    MEDICATIONS  (STANDING):  atorvastatin 40 milliGRAM(s) Oral at bedtime  celecoxib 200 milliGRAM(s) Oral daily  influenza  Vaccine (HIGH DOSE) 0.5 milliLiter(s) IntraMuscular once  magnesium oxide 400 milliGRAM(s) Oral daily  metoprolol tartrate 25 milliGRAM(s) Oral two times a day  raloxifene 60 milliGRAM(s) Oral daily  valsartan 320 milliGRAM(s) Oral daily    MEDICATIONS  (PRN):  acetaminophen     Tablet .. 650 milliGRAM(s) Oral every 6 hours PRN Temp greater or equal to 38C (100.4F), Mild Pain (1 - 3)  aluminum hydroxide/magnesium hydroxide/simethicone Suspension 30 milliLiter(s) Oral every 4 hours PRN Dyspepsia  melatonin 3 milliGRAM(s) Oral at bedtime PRN Insomnia  ondansetron Injectable 4 milliGRAM(s) IV Push every 8 hours PRN Nausea and/or Vomiting      Allergies    No Known Allergies    Intolerances        SOCIAL HISTORY:  no tob,   no alcohol   no drugs    FAMILY HISTORY:    no epilepsy    ROS: 14 point ROS negative other than what is present in HPI or below    Vital Signs Last 24 Hrs  T(C): 36.4 (24 Oct 2024 08:22), Max: 36.7 (24 Oct 2024 03:20)  T(F): 97.6 (24 Oct 2024 08:22), Max: 98.1 (24 Oct 2024 03:20)  HR: 84 (24 Oct 2024 08:22) (68 - 84)  BP: 163/85 (24 Oct 2024 08:22) (135/73 - 163/85)  BP(mean): 100 (24 Oct 2024 06:45) (100 - 100)  RR: 18 (24 Oct 2024 08:22) (13 - 18)  SpO2: 98% (24 Oct 2024 08:22) (97% - 100%)    Parameters below as of 24 Oct 2024 08:22  Patient On (Oxygen Delivery Method): room air    General: NAD    Detailed Neurologic Exam:    Mental status: The patient is awake and alert and has normal attention span.  The patient is fully oriented in 3 spheres.  The patient is able to name objects, follow commands, repeat sentences.    Cranial nerves: Pupils equal and react symmetrically to light. There is no visual field deficit to confrontation. Extraocular motion is full with no nystagmus. There is no ptosis. Facial sensation is intact. Facial musculature is symmetric. Palate elevates symmetrically. Tongue is midline.    Motor: There is normal bulk and tone.  There is no tremor.  Strength is 5/5 in the right arm and leg.   Strength is 5/5 in the left arm and leg.    Sensation: Intact to light touch and pin in 4 extremities    Cerebellar: There is no dysmetria on finger to nose testing.    Gait : deferred    LABS:                         11.6   6.33  )-----------( 218      ( 24 Oct 2024 06:28 )             35.0       10-24    141  |  107  |  17.4  ----------------------------<  110[H]  4.5   |  22.0  |  0.77    Ca    8.8      24 Oct 2024 06:28  Phos  2.9     10-24  Mg     1.6     10-24    TPro  5.9[L]  /  Alb  3.5  /  TBili  0.4  /  DBili  x   /  AST  26  /  ALT  20  /  AlkPhos  80  10-24    RADIOLOGY & ADDITIONAL STUDIES (independently reviewed unless otherwise noted):  CT Head No Cont (10.23.24 @ 19:15)  IMPRESSION:   Unremarkable head CT.

## 2024-10-24 NOTE — PROGRESS NOTE ADULT - ASSESSMENT
ASSESSMENT:  69F with PMHX Pre-DM2, HTN, HLD, Osteoporosis, Osteoarthritis presents to [facility] ER c/o weakness and syncope after gardening/doing yard work outside admitted for syncope and seizure workup.       Syncope r/o Seizure  possible vasovagal   r/o seizure   cont tele , TTE   c doppler   -VS q4  -Check EEG  -Neuro consulted per admitter   -Seizure/Fall Precautions  OOB ambulate     2- h/o HTN  -Valsartan 320mg q24  -Atorvastatin 40mg qHS    3-Pre-DM2  -Lifestyle modification. Out pt F/u for continued mgmt.    4-Osteoporosis/Osteoarthritis  -Meloxicam 7.5mg q24  -Raloxifene 60mg q24    VTE PPX: lovenox     DISPO: Acute. Pending further workup/treatment for syncope and seizure. Pending work up   discharge in 24 hours if test neg  ASSESSMENT:  69F with PMHX Pre-DM2, HTN, HLD, Osteoporosis, Osteoarthritis presents to [facility] ER c/o weakness and syncope after gardening/doing yard work outside admitted for syncope and seizure workup.       Syncope r/o Seizure  possible vasovagal   r/o seizure   cont tele , TTE   c doppler   -VS q4  -Check EEG  -Neuro consulted per admitter   -Seizure/Fall Precautions  OOB ambulate     2- h/o HTN  -Valsartan 320mg q24  -Atorvastatin 40mg qHS    3- Hypomagnesemia   iv mg 2 gm ordered   po mag added   monitor     4-Pre-DM2  -Lifestyle modification. Out pt F/u for continued mgmt.  check HBA1c     4-Osteoporosis/Osteoarthritis  -Meloxicam 7.5mg q24  -Raloxifene 60mg q24    VTE PPX: lovenox     DISPO: Acute. Pending further workup/treatment for syncope and seizure  discharge in 24 hours if test neg

## 2024-10-25 ENCOUNTER — TRANSCRIPTION ENCOUNTER (OUTPATIENT)
Age: 69
End: 2024-10-25

## 2024-10-25 VITALS — DIASTOLIC BLOOD PRESSURE: 67 MMHG | SYSTOLIC BLOOD PRESSURE: 148 MMHG

## 2024-10-25 LAB
ANION GAP SERPL CALC-SCNC: 11 MMOL/L — SIGNIFICANT CHANGE UP (ref 5–17)
BUN SERPL-MCNC: 16.4 MG/DL — SIGNIFICANT CHANGE UP (ref 8–20)
CALCIUM SERPL-MCNC: 8.9 MG/DL — SIGNIFICANT CHANGE UP (ref 8.4–10.5)
CHLORIDE SERPL-SCNC: 105 MMOL/L — SIGNIFICANT CHANGE UP (ref 96–108)
CO2 SERPL-SCNC: 23 MMOL/L — SIGNIFICANT CHANGE UP (ref 22–29)
CREAT SERPL-MCNC: 0.77 MG/DL — SIGNIFICANT CHANGE UP (ref 0.5–1.3)
D DIMER BLD IA.RAPID-MCNC: <150 NG/ML DDU — SIGNIFICANT CHANGE UP
EGFR: 82 ML/MIN/1.73M2 — SIGNIFICANT CHANGE UP
GLUCOSE BLDC GLUCOMTR-MCNC: 127 MG/DL — HIGH (ref 70–99)
GLUCOSE SERPL-MCNC: 104 MG/DL — HIGH (ref 70–99)
MAGNESIUM SERPL-MCNC: 1.9 MG/DL — SIGNIFICANT CHANGE UP (ref 1.6–2.6)
PHOSPHATE SERPL-MCNC: 3.2 MG/DL — SIGNIFICANT CHANGE UP (ref 2.4–4.7)
POTASSIUM SERPL-MCNC: 4.5 MMOL/L — SIGNIFICANT CHANGE UP (ref 3.5–5.3)
POTASSIUM SERPL-SCNC: 4.5 MMOL/L — SIGNIFICANT CHANGE UP (ref 3.5–5.3)
SODIUM SERPL-SCNC: 139 MMOL/L — SIGNIFICANT CHANGE UP (ref 135–145)
TROPONIN T, HIGH SENSITIVITY RESULT: 7 NG/L — SIGNIFICANT CHANGE UP (ref 0–51)
TSH SERPL-MCNC: 2.05 UIU/ML — SIGNIFICANT CHANGE UP (ref 0.27–4.2)
VIT B12 SERPL-MCNC: 1427 PG/ML — HIGH (ref 232–1245)

## 2024-10-25 PROCEDURE — 99232 SBSQ HOSP IP/OBS MODERATE 35: CPT

## 2024-10-25 PROCEDURE — 95718 EEG PHYS/QHP 2-12 HR W/VEEG: CPT

## 2024-10-25 PROCEDURE — 93970 EXTREMITY STUDY: CPT | Mod: 26

## 2024-10-25 PROCEDURE — 75574 CT ANGIO HRT W/3D IMAGE: CPT | Mod: 26

## 2024-10-25 PROCEDURE — 93880 EXTRACRANIAL BILAT STUDY: CPT | Mod: 26

## 2024-10-25 RX ORDER — MAGNESIUM SULFATE IN 0.9% NACL 2 G/50 ML
2 INTRAVENOUS SOLUTION, PIGGYBACK (ML) INTRAVENOUS ONCE
Refills: 0 | Status: COMPLETED | OUTPATIENT
Start: 2024-10-25 | End: 2024-10-25

## 2024-10-25 RX ORDER — ASPIRIN/MAG CARB/ALUMINUM AMIN 325 MG
1 TABLET ORAL
Qty: 0 | Refills: 0 | DISCHARGE
Start: 2024-10-25

## 2024-10-25 RX ORDER — MAGNESIUM OXIDE 400 MG/1
1 TABLET ORAL
Qty: 15 | Refills: 0
Start: 2024-10-25 | End: 2024-11-08

## 2024-10-25 RX ORDER — METOPROLOL TARTRATE 50 MG
1 TABLET ORAL
Qty: 30 | Refills: 0
Start: 2024-10-25 | End: 2024-11-23

## 2024-10-25 RX ORDER — MELOXICAM 7.5 MG
1 TABLET ORAL
Refills: 0 | DISCHARGE

## 2024-10-25 RX ORDER — ASPIRIN/MAG CARB/ALUMINUM AMIN 325 MG
81 TABLET ORAL DAILY
Refills: 0 | Status: DISCONTINUED | OUTPATIENT
Start: 2024-10-25 | End: 2024-10-25

## 2024-10-25 RX ADMIN — Medication 650 MILLIGRAM(S): at 16:55

## 2024-10-25 RX ADMIN — MAGNESIUM OXIDE 400 MILLIGRAM(S): 400 TABLET ORAL at 11:16

## 2024-10-25 RX ADMIN — Medication 25 GRAM(S): at 11:08

## 2024-10-25 RX ADMIN — Medication 200 MILLIGRAM(S): at 12:16

## 2024-10-25 RX ADMIN — RALOXIFENE HYDROCHLORIDE 60 MILLIGRAM(S): 60 TABLET, FILM COATED ORAL at 11:16

## 2024-10-25 RX ADMIN — Medication 25 MILLIGRAM(S): at 05:32

## 2024-10-25 RX ADMIN — Medication 25 MILLIGRAM(S): at 16:56

## 2024-10-25 RX ADMIN — Medication 200 MILLIGRAM(S): at 11:16

## 2024-10-25 RX ADMIN — Medication 650 MILLIGRAM(S): at 17:55

## 2024-10-25 RX ADMIN — Medication 81 MILLIGRAM(S): at 16:56

## 2024-10-25 RX ADMIN — VALSARTAN 320 MILLIGRAM(S): 160 TABLET ORAL at 05:31

## 2024-10-25 NOTE — PROGRESS NOTE ADULT - NS ATTEND AMEND GEN_ALL_CORE FT
Patient seen and examined at bedside with no acute chest pain or shortness of breath   Presented with syncope and NSVT - TTE done shows normal LVEF with no significant valvulopathy and pending CCTA to assess for any obstructive disease     IF normal no further cardiovascular work up, monitor BP and if also controlled no need for escalation or uptiration of medications     Fiorella Rodríguez D.O. [facility]  Cardiology/Vascular Cardiology -[facility]   Telephone # [phone]

## 2024-10-25 NOTE — PROGRESS NOTE ADULT - ASSESSMENT
69F with PMHX Pre-DM2, HTN, HLD, Osteoporosis, Osteoarthritis presents to [facility] ER c/o after a syncopal event witnessed by .  She was gardening and she got up went to  said she was dizzy and she sat down and then "went out " in chair.  Denies any chest pain or sob.  We are being called because on montior she had a asymtomatic 7 beat run of nsvt.  Also had a run of svt. She denies any chest pain or sob  Cm revealed 7 beat wide complex tachycardia and run of svt

## 2024-10-25 NOTE — DISCHARGE NOTE PROVIDER - CARE PROVIDER_API CALL
Fiorella Rodríguez  Cardiology  [address] [place] [id number]  Phone: [phone]  Fax: [phone]  Follow Up Time: 2 weeks

## 2024-10-25 NOTE — PROGRESS NOTE ADULT - SUBJECTIVE AND OBJECTIVE BOX
[facility]                                                         CARDIOLOGY AT [facility]                                                                  [address], [place] [address]                                                         Telephone: [phone]. Fax:[phone]                                                                             PROGRESS NOTE    Reason for follow up: Ischemic eval   Overall Plan: CCTA      Review of symptoms:   Cardiac:  No chest pain. No dyspnea. No palpitations.  Respiratory: no cough. No dyspnea  Gastrointestinal: No diarrhea. No abdominal pain. No bleeding.   Neuro: No focal neuro complaints.    Vitals:  T(C): 36.8 (10-25-24 @ 07:48), Max: 36.8 (10-25-24 @ 07:48)  HR: 59 (10-25-24 @ 07:48) (59 - 67)  BP: 153/85 (10-25-24 @ 07:48) (131/74 - 153/85)  RR: 18 (10-25-24 @ 07:48) (18 - 18)  SpO2: 99% (10-25-24 @ 07:48) (97% - 99%)  Wt(kg): --  I&O's Summary    24 Oct 2024 07:01  -  25 Oct 2024 07:00  --------------------------------------------------------  IN: 400 mL / OUT: 0 mL / NET: 400 mL      Weight (kg): 66.1 (10-24 @ 10:10)    PHYSICAL EXAM:  Appearance: Comfortable. No acute distress  HEENT:  Atraumatic. Normocephalic.  Normal oral mucosa  Neurologic: A & O x 3, no gross focal deficits.  Cardiovascular: RRR S1 S2, No murmur, no rubs/gallops. No JVD  Respiratory: Lungs clear to auscultation, unlabored   Gastrointestinal:  Soft, Non-tender, + BS  Lower Extremities: 2+ Peripheral Pulses, No clubbing, cyanosis, or edema  Psychiatry: Patient is calm. No agitation.   Skin: warm and dry.    CURRENT CARDIAC MEDICATIONS:  metoprolol tartrate 25 milliGRAM(s) Oral two times a day  valsartan 320 milliGRAM(s) Oral daily      CURRENT OTHER MEDICATIONS:  acetaminophen     Tablet .. 650 milliGRAM(s) Oral every 6 hours PRN Temp greater or equal to 38C (100.4F), Mild Pain (1 - 3)  celecoxib 200 milliGRAM(s) Oral daily  melatonin 3 milliGRAM(s) Oral at bedtime PRN Insomnia  ondansetron Injectable 4 milliGRAM(s) IV Push every 8 hours PRN Nausea and/or Vomiting  aluminum hydroxide/magnesium hydroxide/simethicone Suspension 30 milliLiter(s) Oral every 4 hours PRN Dyspepsia  atorvastatin 40 milliGRAM(s) Oral at bedtime  influenza  Vaccine (HIGH DOSE) 0.5 milliLiter(s) IntraMuscular once  magnesium oxide 400 milliGRAM(s) Oral daily  magnesium sulfate  IVPB 2 Gram(s) IV Intermittent once, Stop order after: 1 Doses  raloxifene 60 milliGRAM(s) Oral daily    LABS:	 	                  11.6   6.33  )-----------( 218      ( 24 Oct 2024 06:28 )             35.0     10-25    139  |  105  |  16.4  ----------------------------<  104[H]  4.5   |  23.0  |  0.77    Ca    8.9      25 Oct 2024 04:52  Phos  3.2     10-25  Mg     1.9     10-25    TPro  5.9[L]  /  Alb  3.5  /  TBili  0.4  /  DBili  x   /  AST  26  /  ALT  20  /  AlkPhos  80  10-24      Lipid Profile: Date: 10-24 @ 06:28  Total cholesterol 143; Direct LDL: --; HDL: 54; Triglycerides:64    HgA1c:   TSH: Thyroid Stimulating Hormone, Serum: 2.96 uIU/mL

## 2024-10-25 NOTE — PROGRESS NOTE ADULT - SUBJECTIVE AND OBJECTIVE BOX
Patient is a 69y old  Female who presents with a chief complaint of Syncope/Seizure         Chart reviewed , pt is seen this am   feeling well , no CP , no overnight events           Vital Signs Last 24 Hrs  T(C): 36.8 (25 Oct 2024 07:48), Max: 36.8 (25 Oct 2024 07:48)  T(F): 98.2 (25 Oct 2024 07:48), Max: 98.2 (25 Oct 2024 07:48)  HR: 59 (25 Oct 2024 07:48) (59 - 67)  BP: 153/85 (25 Oct 2024 07:48) (131/74 - 153/85)  BP(mean): 92 (25 Oct 2024 05:29) (92 - 107)  RR: 18 (25 Oct 2024 07:48) (18 - 18)  SpO2: 99% (25 Oct 2024 07:48) (97% - 99%)    Parameters below as of 25 Oct 2024 07:48  Patient On (Oxygen Delivery Method): room air        General: awake alert   Neck: supple , no jvd   Lungs: CTA bilateral   Cardio: RRR, S1/S2, No murmur  Abdomen: Soft, Nontender, Nondistended; Bowel sounds present  Extremities: No calf tenderness, No pitting edema    eee    LABS:                        11.6   6.33  )-----------( 218      ( 24 Oct 2024 06:28 )             35.0     10-24    141  |  107  |  17.4  ----------------------------<  110  4.5   |  22.0  |  0.77    Ca    8.8      24 Oct 2024 06:28  Phos  2.9     10-24  Mg     1.6     10-24    TPro  5.9  /  Alb  3.5  /  TBili  0.4  /  DBili  x   /  AST  26  /  ALT  20  /  AlkPhos  80  10-24                                  POCT Blood Glucose.: 117 mg/dL (23 Oct 2024 14:10)      Urinalysis Basic - ( 24 Oct 2024 06:28 )    Color: x / Appearance: x / SG: x / pH: x  Gluc: 110 mg/dL / Ketone: x  / Bili: x / Urobili: x   Blood: x / Protein: x / Nitrite: x   Leuk Esterase: x / RBC: x / WBC x   Sq Epi: x / Non Sq Epi: x / Bacteria: x          RADIOLOGY & ADDITIONAL TESTS:

## 2024-10-25 NOTE — PROGRESS NOTE ADULT - SUBJECTIVE AND OBJECTIVE BOX
[facility]                                     Neurology at [place]                                 Ankur Ayers & Shubham                                  [address]                                        [place], [place], [address]                                             [phone]    CC: possible seizure  HPI:  The patient is a 69y Female who presented with 30 second episode of unresponsiveness.  He  witnessed the episode. Shehas been outside working in her back yard for 4 hours with little fluid intake and felt dizzy, her  sat her down.  She then had her vision fade to black, shook, turned he head to the right and had 30 seconds of LOC.  She had urine incontinence. There was no significant post ictal confusion.   No previous personal or family history of seizures were reported.  Neurology has been asked to evaluate.    Interval history: no neuro events    Review of systems (neurology): Denies headache or dizziness. Denies weakness/numbness.  Denies speech/language deficits. Denies diplopia/blurred vision.  Denies confusion    MEDICATIONS  (STANDING):  aspirin enteric coated 81 milliGRAM(s) Oral daily  atorvastatin 40 milliGRAM(s) Oral at bedtime  celecoxib 200 milliGRAM(s) Oral daily  influenza  Vaccine (HIGH DOSE) 0.5 milliLiter(s) IntraMuscular once  magnesium oxide 400 milliGRAM(s) Oral daily  metoprolol tartrate 25 milliGRAM(s) Oral two times a day  raloxifene 60 milliGRAM(s) Oral daily  valsartan 320 milliGRAM(s) Oral daily    MEDICATIONS  (PRN):  acetaminophen     Tablet .. 650 milliGRAM(s) Oral every 6 hours PRN Temp greater or equal to 38C (100.4F), Mild Pain (1 - 3)  aluminum hydroxide/magnesium hydroxide/simethicone Suspension 30 milliLiter(s) Oral every 4 hours PRN Dyspepsia  melatonin 3 milliGRAM(s) Oral at bedtime PRN Insomnia  ondansetron Injectable 4 milliGRAM(s) IV Push every 8 hours PRN Nausea and/or Vomiting      Vital Signs Last 24 Hrs  T(C): 36.8 (25 Oct 2024 07:48), Max: 36.8 (25 Oct 2024 07:48)  T(F): 98.2 (25 Oct 2024 07:48), Max: 98.2 (25 Oct 2024 07:48)  HR: 59 (25 Oct 2024 07:48) (59 - 67)  BP: 153/85 (25 Oct 2024 07:48) (131/74 - 153/85)  BP(mean): 92 (25 Oct 2024 05:29) (92 - 107)  RR: 18 (25 Oct 2024 07:48) (18 - 18)  SpO2: 99% (25 Oct 2024 07:48) (97% - 99%)    General: NAD    Detailed Neurologic Exam:    Mental status: The patient is awake and alert and has normal attention span.  The patient is fully oriented in 3 spheres.  The patient is able to name objects, follow commands, repeat sentences.    Cranial nerves: Pupils equal and react symmetrically to light. There is no visual field deficit to confrontation. Extraocular motion is full with no nystagmus. There is no ptosis. Facial sensation is intact. Facial musculature is symmetric. Palate elevates symmetrically. Tongue is midline.    Motor: There is normal bulk and tone.  There is no tremor.  Strength is 5/5 in the right arm and leg.   Strength is 5/5 in the left arm and leg.    Sensation: Intact to light touch and pin in 4 extremities    Cerebellar: There is no dysmetria on finger to nose testing.    Gait : deferred    LABS:                                             11.6   6.33  )-----------( 218      ( 24 Oct 2024 06:28 )             35.0     10-25    139  |  105  |  16.4  ----------------------------<  104[H]  4.5   |  23.0  |  0.77    Ca    8.9      25 Oct 2024 04:52  Phos  3.2     10-25  Mg     1.9     10-25    TPro  5.9[L]  /  Alb  3.5  /  TBili  0.4  /  DBili  x   /  AST  26  /  ALT  20  /  AlkPhos  80  10-24    LIVER FUNCTIONS - ( 24 Oct 2024 06:28 )  Alb: 3.5 g/dL / Pro: 5.9 g/dL / ALK PHOS: 80 U/L / ALT: 20 U/L / AST: 26 U/L / GGT: x           EEG Classification / Summary 10/24-10/25/24:  Normal EEG study in the awake / drowsy / asleep states  -----------------------------------------------------------------------------------------------------  Clinical Impression:  Normal EEG study  There were no seizures or epileptiform abnormalities recorded.      RADIOLOGY & ADDITIONAL STUDIES (independently reviewed unless otherwise noted):  CT Head No Cont (10.23.24 @ 19:15)  IMPRESSION:   Unremarkable head CT.

## 2024-10-25 NOTE — EEG REPORT - NS EEG TEXT BOX
DAVID HINES [facility]-[id number]     Study Date: 10-24-24 13:46 to 10-25-24 08:00  Duration: 17 hr 39 min    --------------------------------------------------------------------------------------------------  History:  CC/ HPI Patient is a 69y old  Female who presents with a chief complaint of Syncope/Seizure (25 Oct 2024 10:14)    MEDICATIONS  (STANDING):  atorvastatin 40 milliGRAM(s) Oral at bedtime  celecoxib 200 milliGRAM(s) Oral daily  influenza  Vaccine (HIGH DOSE) 0.5 milliLiter(s) IntraMuscular once  magnesium oxide 400 milliGRAM(s) Oral daily  magnesium sulfate  IVPB 2 Gram(s) IV Intermittent once  metoprolol tartrate 25 milliGRAM(s) Oral two times a day  raloxifene 60 milliGRAM(s) Oral daily  valsartan 320 milliGRAM(s) Oral daily    --------------------------------------------------------------------------------------------------  Study Interpretation:    [[[Abbreviation Key:  PDR=alpha rhythm/posterior dominant rhythm. A-P=anterior posterior gradient.  Amplitude: ‘very low’:<20; ‘low’:20-50; ‘medium’:; ‘high’:>200uV.  Persistence for periodic/rhythmic patterns (% of epoch) ‘rare’:<1%; ‘occasional’:1-10%; ‘frequent’:10-50%; ‘abundant’:50-90%; ‘continuous’:>90%.  Persistence for sporadic discharges: ‘rare’:<1/hr; ‘occasional’:1/min-1/hr; ‘frequent’:>1/min; ‘abundant’:>1/10 sec.  GRDA=generalized rhythmic delta activity; FIRDA=frontal intermittent GRDA; LRDA=lateralized rhythmic delta activity; TIRDA=temporal intermittent rhythmic delta activity;  LPD=PLED=lateralized periodic discharges; GPD=generalized periodic discharges; BiPDs=BiPLEDs=bilateral independent periodic epileptiform discharges; SIRPID=stimulus induced rhythmic, periodic, or ictal appearing discharges; BIRDs=brief potentially ictal rhythmic discharges >4 Hz, lasting .5-10s; PFA=paroxysmal bursts of beta/gamma; LVFA=low voltage fast activity.  Modifiers: +F=with fast component; +S=with spike component; +R=with rhythmic component.  S-B=burst suppression pattern.  Max=maximal. N1-drowsy; N2-stage II sleep; N3-slow wave sleep. SSS/BETS=small sharp spikes/benign epileptiform transients of sleep. HV=hyperventilation; PS=photic stimulation]]]    FINDINGS:      Background:  Continuity: Continuous  Symmetry: Symmetric  PDR: 9.5 - 10 Hz activity, with amplitude to 40 uV, that attenuated to eye opening.    Reactivity: Present  Voltage: Normal (between 20-150uV)  Anterior Posterior Gradient: Present  Other background findings: None  Breach: Absent    Background Slowing:  Generalized slowing: None was present.  Focal slowing: None was present.    State Changes:   -Drowsiness noted with increased slowing of the background, attenuation of fast activity, vertex transients.  -Present with N2 sleep transients with symmetric spindles and K-complexes.    Sporadic Epileptiform Discharges:    None    Rhythmic and Periodic Patterns (RPPs):  None     Electrographic and Electroclinical seizures:  None    Other Clinical Events:  None    Activation Procedures:   -Hyperventilation was not performed.    -Photic stimulation was performed and did not elicit any abnormalities.       Artifacts:  Intermittent myogenic and movement artifacts were noted.    ECG:  The heart rate on single channel ECG was predominantly between 50 - 60 BPM.    EEG Classification / Summary:  Normal EEG study in the awake / drowsy / asleep states    -----------------------------------------------------------------------------------------------------    Clinical Impression:  Normal EEG study  There were no seizures or epileptiform abnormalities recorded.      This is a preliminary impression still pending attendings attestation.     -------------------------------------------------------------------------------------------------------  EEG reading room: [phone]    After-hours epilepsy service: [phone]      Pascual Sharma DO  Epilepsy Fellow   DAVID HINES [facility]-[id number]     Study Date: 10-24-24 13:46 to 10-25-24 08:00  Duration: 17 hr 39 min    --------------------------------------------------------------------------------------------------  History:  CC/ HPI Patient is a 69y old  Female who presents with a chief complaint of Syncope/Seizure (25 Oct 2024 10:14)    MEDICATIONS  (STANDING):  atorvastatin 40 milliGRAM(s) Oral at bedtime  celecoxib 200 milliGRAM(s) Oral daily  influenza  Vaccine (HIGH DOSE) 0.5 milliLiter(s) IntraMuscular once  magnesium oxide 400 milliGRAM(s) Oral daily  magnesium sulfate  IVPB 2 Gram(s) IV Intermittent once  metoprolol tartrate 25 milliGRAM(s) Oral two times a day  raloxifene 60 milliGRAM(s) Oral daily  valsartan 320 milliGRAM(s) Oral daily    --------------------------------------------------------------------------------------------------  Study Interpretation:    [[[Abbreviation Key:  PDR=alpha rhythm/posterior dominant rhythm. A-P=anterior posterior gradient.  Amplitude: ‘very low’:<20; ‘low’:20-50; ‘medium’:; ‘high’:>200uV.  Persistence for periodic/rhythmic patterns (% of epoch) ‘rare’:<1%; ‘occasional’:1-10%; ‘frequent’:10-50%; ‘abundant’:50-90%; ‘continuous’:>90%.  Persistence for sporadic discharges: ‘rare’:<1/hr; ‘occasional’:1/min-1/hr; ‘frequent’:>1/min; ‘abundant’:>1/10 sec.  GRDA=generalized rhythmic delta activity; FIRDA=frontal intermittent GRDA; LRDA=lateralized rhythmic delta activity; TIRDA=temporal intermittent rhythmic delta activity;  LPD=PLED=lateralized periodic discharges; GPD=generalized periodic discharges; BiPDs=BiPLEDs=bilateral independent periodic epileptiform discharges; SIRPID=stimulus induced rhythmic, periodic, or ictal appearing discharges; BIRDs=brief potentially ictal rhythmic discharges >4 Hz, lasting .5-10s; PFA=paroxysmal bursts of beta/gamma; LVFA=low voltage fast activity.  Modifiers: +F=with fast component; +S=with spike component; +R=with rhythmic component.  S-B=burst suppression pattern.  Max=maximal. N1-drowsy; N2-stage II sleep; N3-slow wave sleep. SSS/BETS=small sharp spikes/benign epileptiform transients of sleep. HV=hyperventilation; PS=photic stimulation]]]    FINDINGS:      Background:  Continuity: Continuous  Symmetry: Symmetric  PDR: 9.5 - 10 Hz activity, with amplitude to 40 uV, that attenuated to eye opening.    Reactivity: Present  Voltage: Normal (between 20-150uV)  Anterior Posterior Gradient: Present  Other background findings: None  Breach: Absent    Background Slowing:  Generalized slowing: None was present.  Focal slowing: None was present.    State Changes:   -Drowsiness noted with increased slowing of the background, attenuation of fast activity, vertex transients.  -Present with N2 sleep transients with symmetric spindles and K-complexes.    Sporadic Epileptiform Discharges:    None    Rhythmic and Periodic Patterns (RPPs):  None     Electrographic and Electroclinical seizures:  None    Other Clinical Events:  None    Activation Procedures:   -Hyperventilation was not performed.    -Photic stimulation was performed and did not elicit any abnormalities.       Artifacts:  Intermittent myogenic and movement artifacts were noted.    ECG:  The heart rate on single channel ECG was predominantly between 50 - 60 BPM.    EEG Classification / Summary:  Normal EEG study in the awake / drowsy / asleep states    -----------------------------------------------------------------------------------------------------    Clinical Impression:  Normal EEG study  There were no seizures or epileptiform abnormalities recorded.      -------------------------------------------------------------------------------------------------------  EEG reading room: [phone]    After-hours epilepsy service: [phone]      Pascual Sharma DO  Epilepsy Fellow    Luis Hebert MD  Neurology Attending Physician   DAVID HINES [facility]-[id number]     Study Date: 10-24-24 13:46 to 10-25-24 13:59  Duration: 24 hr 13 min    --------------------------------------------------------------------------------------------------  History:  CC/ HPI Patient is a 69y old  Female who presents with a chief complaint of Syncope/Seizure (25 Oct 2024 10:14)    MEDICATIONS  (STANDING):  atorvastatin 40 milliGRAM(s) Oral at bedtime  celecoxib 200 milliGRAM(s) Oral daily  influenza  Vaccine (HIGH DOSE) 0.5 milliLiter(s) IntraMuscular once  magnesium oxide 400 milliGRAM(s) Oral daily  magnesium sulfate  IVPB 2 Gram(s) IV Intermittent once  metoprolol tartrate 25 milliGRAM(s) Oral two times a day  raloxifene 60 milliGRAM(s) Oral daily  valsartan 320 milliGRAM(s) Oral daily    --------------------------------------------------------------------------------------------------  Study Interpretation:    [[[Abbreviation Key:  PDR=alpha rhythm/posterior dominant rhythm. A-P=anterior posterior gradient.  Amplitude: ‘very low’:<20; ‘low’:20-50; ‘medium’:; ‘high’:>200uV.  Persistence for periodic/rhythmic patterns (% of epoch) ‘rare’:<1%; ‘occasional’:1-10%; ‘frequent’:10-50%; ‘abundant’:50-90%; ‘continuous’:>90%.  Persistence for sporadic discharges: ‘rare’:<1/hr; ‘occasional’:1/min-1/hr; ‘frequent’:>1/min; ‘abundant’:>1/10 sec.  GRDA=generalized rhythmic delta activity; FIRDA=frontal intermittent GRDA; LRDA=lateralized rhythmic delta activity; TIRDA=temporal intermittent rhythmic delta activity;  LPD=PLED=lateralized periodic discharges; GPD=generalized periodic discharges; BiPDs=BiPLEDs=bilateral independent periodic epileptiform discharges; SIRPID=stimulus induced rhythmic, periodic, or ictal appearing discharges; BIRDs=brief potentially ictal rhythmic discharges >4 Hz, lasting .5-10s; PFA=paroxysmal bursts of beta/gamma; LVFA=low voltage fast activity.  Modifiers: +F=with fast component; +S=with spike component; +R=with rhythmic component.  S-B=burst suppression pattern.  Max=maximal. N1-drowsy; N2-stage II sleep; N3-slow wave sleep. SSS/BETS=small sharp spikes/benign epileptiform transients of sleep. HV=hyperventilation; PS=photic stimulation]]]    FINDINGS:      Background:  Continuity: Continuous  Symmetry: Symmetric  PDR: 9.5 - 10 Hz activity, with amplitude to 40 uV, that attenuated to eye opening.    Reactivity: Present  Voltage: Normal (between 20-150uV)  Anterior Posterior Gradient: Present  Other background findings: None  Breach: Absent    Background Slowing:  Generalized slowing: None was present.  Focal slowing: None was present.    State Changes:   -Drowsiness noted with increased slowing of the background, attenuation of fast activity, vertex transients.  -Present with N2 sleep transients with symmetric spindles and K-complexes.    Sporadic Epileptiform Discharges:    None    Rhythmic and Periodic Patterns (RPPs):  None     Electrographic and Electroclinical seizures:  None    Other Clinical Events:  None    Activation Procedures:   -Hyperventilation was not performed.    -Photic stimulation was performed and did not elicit any abnormalities.       Artifacts:  Intermittent myogenic and movement artifacts were noted.    ECG:  The heart rate on single channel ECG was predominantly between 50 - 60 BPM.    EEG Classification / Summary:  Normal EEG study in the awake / drowsy / asleep states    -----------------------------------------------------------------------------------------------------    Clinical Impression:  Normal EEG study  There were no seizures or epileptiform abnormalities recorded.      -------------------------------------------------------------------------------------------------------  EEG reading room: [phone]    After-hours epilepsy service: [phone]      Pascual Sharma DO  Epilepsy Fellow    Luis Hebert MD  Neurology Attending Physician

## 2024-10-25 NOTE — DISCHARGE NOTE NURSING/CASE MANAGEMENT/SOCIAL WORK - FINANCIAL ASSISTANCE
[facility] provides services at a reduced cost to those who are determined to be eligible through [facility]’s financial assistance program. Information regarding [facility]’s financial assistance program can be found by going to [email or web address] or by calling [phone].

## 2024-10-25 NOTE — PROGRESS NOTE ADULT - ASSESSMENT
ASSESSMENT:  69F with PMHX Pre-DM2, HTN, HLD, Osteoporosis, Osteoarthritis presents to [facility] ER c/o weakness and syncope after gardening/doing yard work outside admitted for syncope and seizure workup.       1-Syncope r/o Seizure ns cardiac events   tele ; pt had episode of NSVT 7 beats on 10/24   lytes reolaced   no further events   TTE EF normal   \cardiology follow up appreciated   ischemic work up - CCTA r/o coronary artery disease   c doppler P   EEG r/o seizure  in progress   clinically pt is stable no complaints   OOB ambulate   added metoprolol   ,    2- HTN  -Valsartan 320mg q24  -Atorvastatin 40mg qHS  added metoprolol for NSVT     3- Hypomagnesemia   replaced        4-Pre-DM2  Lifestyle modification.   Out pt F/u for continued mgmt with PCP   low carb diet   hba1c 6.4     4-Osteoporosis/Osteoarthritis  -Meloxicam 7.5mg q24  -Raloxifene 60mg q24    VTE PPX: lovenox     DISPO: Acute. Pending further workup/treatment for syncope and seizure  discharge soon likely

## 2024-10-25 NOTE — DISCHARGE NOTE PROVIDER - HOSPITAL COURSE
69F with PMHX Pre-DM2, HTN, HLD, Osteoporosis, Osteoarthritis presents to [facility] ER c/o weakness and syncope after gardening/doing yard work outside admitted for syncope and seizure workup. She had seen by cardiology and neurology , vEEG performed no seizure   She had NSVT on tele ; CCTA r/o coronary artery disease performed   added metoprolol, asa    69F with PMHX Pre-DM2, HTN, HLD, Osteoporosis, Osteoarthritis presents to [facility] ER c/o weakness and syncope after gardening/doing yard work outside admitted for syncope and seizure workup. She had seen by cardiology and neurology , vEEG performed no seizure   She had NSVT on tele ; CCTA r/o coronary artery disease performed , showed mild disease   added metoprolol, asa m CCTA mild disease per cardio , stable for DC

## 2024-10-25 NOTE — DISCHARGE NOTE NURSING/CASE MANAGEMENT/SOCIAL WORK - PATIENT PORTAL LINK FT
You can access the FollowMyHealth Patient Portal offered by [facility] by registering at the following website: [email or web address]. By joining [facility]’s FollowMyHealth portal, you will also be able to view your health information using other applications (apps) compatible with our system.

## 2024-10-25 NOTE — DISCHARGE NOTE PROVIDER - NSDCCPCAREPLAN_GEN_ALL_CORE_FT
PRINCIPAL DISCHARGE DIAGNOSIS  Diagnosis: Vasovagal syncope  Assessment and Plan of Treatment: hydrate , avoid standing to long      SECONDARY DISCHARGE DIAGNOSES  Diagnosis: Atrial tachycardia  Assessment and Plan of Treatment: continue ,metoprolol, take ecotrin 81 mg daily       Diagnosis: Hypertension  Assessment and Plan of Treatment:

## 2024-10-25 NOTE — PROGRESS NOTE ADULT - PROBLEM SELECTOR PLAN 1
- unknown etiology behind syncopal episode  - on telemetry pt had episode of NSVT 7 beats  - we do not have evidence that NSVT potentiated syncopal episode.   - she complained of dizziness after standing and then while walking, then passed out in chair prior to admission  - sounds like vasovagal vs orthostatic hypotension   - Pt's BUN was elevated in pre-renal fashion on admission, likely pt was dehydrated  - outpatient pt was taking raloxifene which carries adverse event risk of VTE. There is no evidence for VTE, pt does not meet well's criteria. No hypoxia or tachycardia.   - out of an abundance of caution will check d-dimer and LE venous duplex   - no angina/no anginal equivalents  - troponin is normal and flat x 4  - electrolytes are mostly normal, magnesium was low 1.6 -->1.9 continue PO repletion   - Echo was done EF is 65%  - we will check CCTA r/o coronary artery disease   - we will follow

## 2024-10-25 NOTE — DISCHARGE NOTE PROVIDER - NSDCMRMEDTOKEN_GEN_ALL_CORE_FT
atorvastatin 40 mg oral tablet: 1 tab(s) orally once a day (at bedtime)  meloxicam 7.5 mg oral tablet: 1 tab(s) orally once a day  raloxifene 60 mg oral tablet: 1 tab(s) orally once a day  valsartan 320 mg oral tablet: 1 tab(s) orally once a day   aspirin 81 mg oral delayed release tablet: 1 tab(s) orally once a day  atorvastatin 40 mg oral tablet: 1 tab(s) orally once a day (at bedtime)  magnesium oxide 400 mg oral tablet: 1 tab(s) orally once a day  metoprolol succinate 25 mg oral tablet, extended release: 1 tab(s) orally once a day  raloxifene 60 mg oral tablet: 1 tab(s) orally once a day  valsartan 320 mg oral tablet: 1 tab(s) orally once a day

## 2024-10-25 NOTE — PROGRESS NOTE ADULT - ASSESSMENT
The patient is a 69y Female who is followed by neurology because of possible seizure.  The description provided by her  sounds like it could be syncope vs seziure    Syncope vs seizure  ? due to dehydration  ? seizure    - continuous was normal for one day     - can d/c eeg    - hold off on starting anti-seizure meds now     Neuro stable, cardiac work up underway    There is no further inpatient neurologic workup suggested at this time.  We will be available for reconsultation as needed.    Thank you.   Burke Pascual MD, PhD  [phone]

## 2024-11-26 PROCEDURE — 95714 VEEG EA 12-26 HR UNMNTR: CPT

## 2024-11-26 PROCEDURE — T1013: CPT

## 2024-11-26 PROCEDURE — 93306 TTE W/DOPPLER COMPLETE: CPT

## 2024-11-26 PROCEDURE — 95711 VEEG 2-12 HR UNMONITORED: CPT

## 2024-11-26 PROCEDURE — 85379 FIBRIN DEGRADATION QUANT: CPT

## 2024-11-26 PROCEDURE — 82550 ASSAY OF CK (CPK): CPT

## 2024-11-26 PROCEDURE — 80053 COMPREHEN METABOLIC PANEL: CPT

## 2024-11-26 PROCEDURE — 99285 EMERGENCY DEPT VISIT HI MDM: CPT | Mod: 25

## 2024-11-26 PROCEDURE — 70450 CT HEAD/BRAIN W/O DYE: CPT | Mod: MC

## 2024-11-26 PROCEDURE — 75574 CT ANGIO HRT W/3D IMAGE: CPT | Mod: MC

## 2024-11-26 PROCEDURE — 83036 HEMOGLOBIN GLYCOSYLATED A1C: CPT

## 2024-11-26 PROCEDURE — 36415 COLL VENOUS BLD VENIPUNCTURE: CPT

## 2024-11-26 PROCEDURE — 80061 LIPID PANEL: CPT

## 2024-11-26 PROCEDURE — 82962 GLUCOSE BLOOD TEST: CPT

## 2024-11-26 PROCEDURE — 84100 ASSAY OF PHOSPHORUS: CPT

## 2024-11-26 PROCEDURE — 84443 ASSAY THYROID STIM HORMONE: CPT

## 2024-11-26 PROCEDURE — 93005 ELECTROCARDIOGRAM TRACING: CPT

## 2024-11-26 PROCEDURE — 84484 ASSAY OF TROPONIN QUANT: CPT

## 2024-11-26 PROCEDURE — 85025 COMPLETE CBC W/AUTO DIFF WBC: CPT

## 2024-11-26 PROCEDURE — 95700 EEG CONT REC W/VID EEG TECH: CPT

## 2024-11-26 PROCEDURE — 82607 VITAMIN B-12: CPT

## 2024-11-26 PROCEDURE — 93880 EXTRACRANIAL BILAT STUDY: CPT

## 2024-11-26 PROCEDURE — 83735 ASSAY OF MAGNESIUM: CPT

## 2024-11-26 PROCEDURE — 71045 X-RAY EXAM CHEST 1 VIEW: CPT

## 2024-11-26 PROCEDURE — 80048 BASIC METABOLIC PNL TOTAL CA: CPT

## 2024-11-26 PROCEDURE — 93970 EXTREMITY STUDY: CPT
